# Patient Record
Sex: FEMALE | Race: WHITE | NOT HISPANIC OR LATINO
[De-identification: names, ages, dates, MRNs, and addresses within clinical notes are randomized per-mention and may not be internally consistent; named-entity substitution may affect disease eponyms.]

---

## 2017-06-28 ENCOUNTER — TRANSCRIPTION ENCOUNTER (OUTPATIENT)
Age: 22
End: 2017-06-28

## 2018-06-30 ENCOUNTER — TRANSCRIPTION ENCOUNTER (OUTPATIENT)
Age: 23
End: 2018-06-30

## 2019-01-06 ENCOUNTER — TRANSCRIPTION ENCOUNTER (OUTPATIENT)
Age: 24
End: 2019-01-06

## 2019-02-05 ENCOUNTER — OUTPATIENT (OUTPATIENT)
Dept: OUTPATIENT SERVICES | Facility: HOSPITAL | Age: 24
LOS: 1 days | Discharge: HOME | End: 2019-02-05

## 2019-02-05 DIAGNOSIS — K46.9 UNSPECIFIED ABDOMINAL HERNIA WITHOUT OBSTRUCTION OR GANGRENE: ICD-10-CM

## 2019-02-12 PROBLEM — Z00.00 ENCOUNTER FOR PREVENTIVE HEALTH EXAMINATION: Status: ACTIVE | Noted: 2019-02-12

## 2019-02-22 ENCOUNTER — APPOINTMENT (OUTPATIENT)
Dept: CARDIOTHORACIC SURGERY | Facility: CLINIC | Age: 24
End: 2019-02-22

## 2019-02-22 VITALS
HEIGHT: 67 IN | OXYGEN SATURATION: 98 % | SYSTOLIC BLOOD PRESSURE: 136 MMHG | WEIGHT: 164 LBS | DIASTOLIC BLOOD PRESSURE: 81 MMHG | HEART RATE: 93 BPM | BODY MASS INDEX: 25.74 KG/M2 | TEMPERATURE: 98.1 F | RESPIRATION RATE: 12 BRPM

## 2019-02-22 DIAGNOSIS — M24.80 OTHER SPECIFIC JOINT DERANGEMENTS OF UNSPECIFIED JOINT, NOT ELSEWHERE CLASSIFIED: ICD-10-CM

## 2019-02-22 DIAGNOSIS — S73.199A OTHER SPRAIN OF UNSPECIFIED HIP, INITIAL ENCOUNTER: ICD-10-CM

## 2019-02-22 DIAGNOSIS — Z78.9 OTHER SPECIFIED HEALTH STATUS: ICD-10-CM

## 2019-02-22 DIAGNOSIS — Z86.59 PERSONAL HISTORY OF OTHER MENTAL AND BEHAVIORAL DISORDERS: ICD-10-CM

## 2019-02-22 DIAGNOSIS — F64.0 TRANSSEXUALISM: ICD-10-CM

## 2019-02-22 DIAGNOSIS — Z82.49 FAMILY HISTORY OF ISCHEMIC HEART DISEASE AND OTHER DISEASES OF THE CIRCULATORY SYSTEM: ICD-10-CM

## 2019-02-22 DIAGNOSIS — F17.200 NICOTINE DEPENDENCE, UNSPECIFIED, UNCOMPLICATED: ICD-10-CM

## 2019-02-22 DIAGNOSIS — R93.3 ABNORMAL FINDINGS ON DIAGNOSTIC IMAGING OF OTHER PARTS OF DIGESTIVE TRACT: ICD-10-CM

## 2019-02-22 RX ORDER — ERGOCALCIFEROL (VITAMIN D2) 1250 MCG
50000 CAPSULE ORAL
Refills: 0 | Status: ACTIVE | COMMUNITY

## 2019-02-22 RX ORDER — CYANOCOBALAMIN (VITAMIN B-12) 5000 MCG
5000 TABLET,DISINTEGRATING ORAL
Refills: 0 | Status: ACTIVE | COMMUNITY

## 2019-02-22 NOTE — PHYSICAL EXAM
[General Appearance - Alert] : alert [General Appearance - In No Acute Distress] : in no acute distress [Sclera] : the sclera and conjunctiva were normal [PERRL With Normal Accommodation] : pupils were equal in size, round, and reactive to light [Extraocular Movements] : extraocular movements were intact [Outer Ear] : the ears and nose were normal in appearance [Oropharynx] : the oropharynx was normal [Neck Appearance] : the appearance of the neck was normal [Neck Cervical Mass (___cm)] : no neck mass was observed [Jugular Venous Distention Increased] : there was no jugular-venous distention [Thyroid Diffuse Enlargement] : the thyroid was not enlarged [Thyroid Nodule] : there were no palpable thyroid nodules [] : no respiratory distress [Auscultation Breath Sounds / Voice Sounds] : lungs were clear to auscultation bilaterally [Heart Rate And Rhythm] : heart rate was normal and rhythm regular [Heart Sounds] : normal S1 and S2 [Heart Sounds Gallop] : no gallops [Murmurs] : no murmurs [Heart Sounds Pericardial Friction Rub] : no pericardial rub [Distended] : distended [Epigastric] : in the epigastric area [Periumbilical] : in the periumbilical area [Abnormal Walk] : normal gait [Nail Clubbing] : no clubbing  or cyanosis of the fingernails [Musculoskeletal - Swelling] : no joint swelling seen [Motor Tone] : muscle strength and tone were normal [Deep Tendon Reflexes (DTR)] : deep tendon reflexes were 2+ and symmetric [Sensation] : the sensory exam was normal to light touch and pinprick [No Focal Deficits] : no focal deficits [Oriented To Time, Place, And Person] : oriented to person, place, and time [Impaired Insight] : insight and judgment were intact [Affect] : the affect was normal

## 2019-02-27 ENCOUNTER — FORM ENCOUNTER (OUTPATIENT)
Age: 24
End: 2019-02-27

## 2019-02-28 ENCOUNTER — OUTPATIENT (OUTPATIENT)
Dept: OUTPATIENT SERVICES | Facility: HOSPITAL | Age: 24
LOS: 1 days | Discharge: HOME | End: 2019-02-28

## 2019-02-28 DIAGNOSIS — R10.2 PELVIC AND PERINEAL PAIN: ICD-10-CM

## 2019-02-28 DIAGNOSIS — K31.89 OTHER DISEASES OF STOMACH AND DUODENUM: ICD-10-CM

## 2019-02-28 DIAGNOSIS — R10.9 UNSPECIFIED ABDOMINAL PAIN: ICD-10-CM

## 2019-03-03 NOTE — REASON FOR VISIT
[Initial Evaluation] : an initial evaluation [Parent] : parent [FreeTextEntry1] : Organoaxial rotation of stomach
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
## 2019-03-03 NOTE — CONSULT LETTER
[Dear  ___] : Dear  [unfilled], [Consult Letter:] : I had the pleasure of evaluating your patient, [unfilled]. [Consult Closing:] : Thank you very much for allowing me to participate in the care of this patient.  If you have any questions, please do not hesitate to contact me. [Sincerely,] : Sincerely, [FreeTextEntry2] : Chelle Jorge [FreeTextEntry1] : This 24 year old patient presents with imaging suggestive of organo-axial rotation of the stomach.  They have symptoms of obstruction that were initially attributed to indigestion or OB issues.  There is a question of hiatal hernia, but the patient notes that this is not the case, this appears to have been a documentation issue in the past.  I would plan for laparoscopic repair of rotation (derotation and gastropexy).  It may be feasible to also perform hysterectomy at this time as the patient is desirous of this as part of their transitioning.  I do not see any medical contraindication to this.  Will plan for CT abdomen in the interim for better surgical planning.  I did discuss with the patient that any further abdominal pain episodes, vomiting, inability to tolerate oral intake, should be addressed with a low threshold for evaluation in the ER to ensure there is not obstruction or vascular compromise of the stomach. [FreeTextEntry3] : Daron Delong M.D.\par Chief, Division of Thoracic Surgery\par Department of Cardiothoracic Surgery\par

## 2019-03-03 NOTE — DATA REVIEWED
[FreeTextEntry1] : EXAM: XR UGI AIR CONTRAST W KUB  02/05/2019 \par \par INTERPRETATION: Clinical History / Reason for exam: Follow-up hiatal hernia \par on endoscopy. \par \par Procedure/technique: Double contrast barium upper GI series was performed. \par EZ gas is given orally. Following the oral ingestion of barium, rapid \par sequence imaging of the esophagus, stomach and proximal small bowel was done \par under fluoroscopic control. \par \par Comparison: None. \par \par Findings: \par \par The swallowing mechanism is intact. \par \par Barium transverses the esophagus with no obstruction. There is no evidence \par of mass or ulceration. \par \par There is no evidence of hiatal hernia. The GE junction is in normal \par position. There is noted to be organoaxial rotation of the stomach, with the \par antrum rotated superiorly along the long axis of the stomach. The duodenal \par bulb is unremarkable. \par \par There is no evidence of gastroesophageal reflux. There is no evidence of \par obstruction. \par \par Stomach, duodenum and several loops of proximal small bowel are filled with \par no evidence of mass or ulceration. \par \par A 13 mm barium tablet transverse the esophagus with no obstruction. \par \par Impression: \par No evidence of hiatal hernia, with normal positioning of the GE junction. \par Organoaxial rotation of the stomach, with antrum rotated superiorly along the long axis of the stomach. \par No evidence of gastric outlet obstruction. \par \par Dr. Jorge office visit 2/11/19\par Impression\par Abnormal findings on diagnostic imaging of other parts of digestive tract- R/O Organoaxial Rotation\par \par EGD 1/29/19\par Impression:\par Diaphragmatic hernia without obstruction or gangrene\par Acute gastritis without bleeding
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
## 2019-03-03 NOTE — ADDENDUM
[FreeTextEntry1] : Patient is scheduled for the CT scan for 2/28/19, and will followup with GYN Dr. Daron Perez 3/25/19 for eval.

## 2019-03-25 ENCOUNTER — OUTPATIENT (OUTPATIENT)
Dept: OUTPATIENT SERVICES | Facility: HOSPITAL | Age: 24
LOS: 1 days | Discharge: HOME | End: 2019-03-25

## 2019-03-25 ENCOUNTER — APPOINTMENT (OUTPATIENT)
Dept: OBGYN | Facility: CLINIC | Age: 24
End: 2019-03-25

## 2019-03-25 VITALS — SYSTOLIC BLOOD PRESSURE: 120 MMHG | WEIGHT: 170 LBS | DIASTOLIC BLOOD PRESSURE: 70 MMHG

## 2019-03-25 DIAGNOSIS — B97.7 PAPILLOMAVIRUS AS THE CAUSE OF DISEASES CLASSIFIED ELSEWHERE: ICD-10-CM

## 2019-03-25 NOTE — PHYSICAL EXAM
[Alert] : alert [Acute Distress] : no acute distress [Soft] : soft [Tender] : non tender [Distended] : not distended [H/Smegaly] : no hepatosplenomegaly [Labia Majora] : labia major [Labia Minora] : labia minora [Normal] : clitoris [Pap Obtained] : a Pap smear was performed [FreeTextEntry4] : very narrow vagina [FreeTextEntry5] : minimal blood on cervix

## 2019-03-26 DIAGNOSIS — N80.9 ENDOMETRIOSIS, UNSPECIFIED: ICD-10-CM

## 2019-03-26 DIAGNOSIS — R10.2 PELVIC AND PERINEAL PAIN: ICD-10-CM

## 2019-03-26 DIAGNOSIS — N92.6 IRREGULAR MENSTRUATION, UNSPECIFIED: ICD-10-CM

## 2019-03-26 DIAGNOSIS — F64.0 TRANSSEXUALISM: ICD-10-CM

## 2019-04-02 LAB — HPV HIGH+LOW RISK DNA PNL CVX: NOT DETECTED

## 2019-04-08 ENCOUNTER — APPOINTMENT (OUTPATIENT)
Dept: CARDIOLOGY | Facility: CLINIC | Age: 24
End: 2019-04-08
Payer: COMMERCIAL

## 2019-04-08 VITALS
SYSTOLIC BLOOD PRESSURE: 96 MMHG | HEIGHT: 67 IN | HEART RATE: 74 BPM | DIASTOLIC BLOOD PRESSURE: 60 MMHG | WEIGHT: 166 LBS | BODY MASS INDEX: 26.06 KG/M2

## 2019-04-08 PROCEDURE — 93000 ELECTROCARDIOGRAM COMPLETE: CPT

## 2019-04-08 PROCEDURE — 99204 OFFICE O/P NEW MOD 45 MIN: CPT

## 2019-04-08 RX ORDER — FOLIC ACID 1 MG/1
1 TABLET ORAL DAILY
Refills: 0 | Status: ACTIVE | COMMUNITY

## 2019-04-09 ENCOUNTER — FORM ENCOUNTER (OUTPATIENT)
Age: 24
End: 2019-04-09

## 2019-04-09 ENCOUNTER — OUTPATIENT (OUTPATIENT)
Dept: OUTPATIENT SERVICES | Facility: HOSPITAL | Age: 24
LOS: 1 days | Discharge: HOME | End: 2019-04-09
Payer: COMMERCIAL

## 2019-04-09 VITALS
OXYGEN SATURATION: 98 % | HEART RATE: 80 BPM | DIASTOLIC BLOOD PRESSURE: 83 MMHG | WEIGHT: 163.14 LBS | TEMPERATURE: 98 F | SYSTOLIC BLOOD PRESSURE: 136 MMHG | HEIGHT: 66 IN | RESPIRATION RATE: 20 BRPM

## 2019-04-09 DIAGNOSIS — Z01.818 ENCOUNTER FOR OTHER PREPROCEDURAL EXAMINATION: ICD-10-CM

## 2019-04-09 DIAGNOSIS — K31.89 OTHER DISEASES OF STOMACH AND DUODENUM: ICD-10-CM

## 2019-04-09 LAB
ALBUMIN SERPL ELPH-MCNC: 4.9 G/DL — SIGNIFICANT CHANGE UP (ref 3.5–5.2)
ALP SERPL-CCNC: 88 U/L — SIGNIFICANT CHANGE UP (ref 30–115)
ALT FLD-CCNC: 17 U/L — SIGNIFICANT CHANGE UP (ref 0–41)
ANION GAP SERPL CALC-SCNC: 14 MMOL/L — SIGNIFICANT CHANGE UP (ref 7–14)
APPEARANCE UR: CLEAR — SIGNIFICANT CHANGE UP
APTT BLD: 32.6 SEC — SIGNIFICANT CHANGE UP (ref 27–39.2)
AST SERPL-CCNC: 16 U/L — SIGNIFICANT CHANGE UP (ref 0–41)
BACTERIA # UR AUTO: ABNORMAL /HPF
BASOPHILS # BLD AUTO: 0.05 K/UL — SIGNIFICANT CHANGE UP (ref 0–0.2)
BASOPHILS NFR BLD AUTO: 0.6 % — SIGNIFICANT CHANGE UP (ref 0–1)
BILIRUB SERPL-MCNC: 0.3 MG/DL — SIGNIFICANT CHANGE UP (ref 0.2–1.2)
BILIRUB UR-MCNC: NEGATIVE — SIGNIFICANT CHANGE UP
BLD GP AB SCN SERPL QL: SIGNIFICANT CHANGE UP
BUN SERPL-MCNC: 12 MG/DL — SIGNIFICANT CHANGE UP (ref 10–20)
CALCIUM SERPL-MCNC: 10.5 MG/DL — HIGH (ref 8.5–10.1)
CHLORIDE SERPL-SCNC: 99 MMOL/L — SIGNIFICANT CHANGE UP (ref 98–110)
CO2 SERPL-SCNC: 27 MMOL/L — SIGNIFICANT CHANGE UP (ref 17–32)
COLOR SPEC: YELLOW — SIGNIFICANT CHANGE UP
CREAT SERPL-MCNC: 0.8 MG/DL — SIGNIFICANT CHANGE UP (ref 0.7–1.5)
DIFF PNL FLD: NEGATIVE — SIGNIFICANT CHANGE UP
EOSINOPHIL # BLD AUTO: 0.31 K/UL — SIGNIFICANT CHANGE UP (ref 0–0.7)
EOSINOPHIL NFR BLD AUTO: 3.5 % — SIGNIFICANT CHANGE UP (ref 0–8)
GLUCOSE SERPL-MCNC: 78 MG/DL — SIGNIFICANT CHANGE UP (ref 70–99)
GLUCOSE UR QL: NEGATIVE MG/DL — SIGNIFICANT CHANGE UP
HCT VFR BLD CALC: 45.5 % — SIGNIFICANT CHANGE UP (ref 37–47)
HGB BLD-MCNC: 15.3 G/DL — SIGNIFICANT CHANGE UP (ref 12–16)
IMM GRANULOCYTES NFR BLD AUTO: 0.3 % — SIGNIFICANT CHANGE UP (ref 0.1–0.3)
INR BLD: 1.03 RATIO — SIGNIFICANT CHANGE UP (ref 0.65–1.3)
KETONES UR-MCNC: NEGATIVE — SIGNIFICANT CHANGE UP
LEUKOCYTE ESTERASE UR-ACNC: ABNORMAL
LYMPHOCYTES # BLD AUTO: 2.62 K/UL — SIGNIFICANT CHANGE UP (ref 1.2–3.4)
LYMPHOCYTES # BLD AUTO: 29.9 % — SIGNIFICANT CHANGE UP (ref 20.5–51.1)
MCHC RBC-ENTMCNC: 29.5 PG — SIGNIFICANT CHANGE UP (ref 27–31)
MCHC RBC-ENTMCNC: 33.6 G/DL — SIGNIFICANT CHANGE UP (ref 32–37)
MCV RBC AUTO: 87.7 FL — SIGNIFICANT CHANGE UP (ref 81–99)
MONOCYTES # BLD AUTO: 0.42 K/UL — SIGNIFICANT CHANGE UP (ref 0.1–0.6)
MONOCYTES NFR BLD AUTO: 4.8 % — SIGNIFICANT CHANGE UP (ref 1.7–9.3)
NEUTROPHILS # BLD AUTO: 5.33 K/UL — SIGNIFICANT CHANGE UP (ref 1.4–6.5)
NEUTROPHILS NFR BLD AUTO: 60.9 % — SIGNIFICANT CHANGE UP (ref 42.2–75.2)
NITRITE UR-MCNC: NEGATIVE — SIGNIFICANT CHANGE UP
NRBC # BLD: 0 /100 WBCS — SIGNIFICANT CHANGE UP (ref 0–0)
PH UR: 7 — SIGNIFICANT CHANGE UP (ref 5–8)
PLATELET # BLD AUTO: 307 K/UL — SIGNIFICANT CHANGE UP (ref 130–400)
POTASSIUM SERPL-MCNC: 4.7 MMOL/L — SIGNIFICANT CHANGE UP (ref 3.5–5)
POTASSIUM SERPL-SCNC: 4.7 MMOL/L — SIGNIFICANT CHANGE UP (ref 3.5–5)
PROT SERPL-MCNC: 8.1 G/DL — HIGH (ref 6–8)
PROT UR-MCNC: NEGATIVE MG/DL — SIGNIFICANT CHANGE UP
PROTHROM AB SERPL-ACNC: 11.8 SEC — SIGNIFICANT CHANGE UP (ref 9.95–12.87)
RBC # BLD: 5.19 M/UL — SIGNIFICANT CHANGE UP (ref 4.2–5.4)
RBC # FLD: 12.1 % — SIGNIFICANT CHANGE UP (ref 11.5–14.5)
SODIUM SERPL-SCNC: 140 MMOL/L — SIGNIFICANT CHANGE UP (ref 135–146)
SP GR SPEC: 1.01 — SIGNIFICANT CHANGE UP (ref 1.01–1.03)
TYPE + AB SCN PNL BLD: SIGNIFICANT CHANGE UP
UROBILINOGEN FLD QL: 0.2 MG/DL — SIGNIFICANT CHANGE UP (ref 0.2–0.2)
WBC # BLD: 8.76 K/UL — SIGNIFICANT CHANGE UP (ref 4.8–10.8)
WBC # FLD AUTO: 8.76 K/UL — SIGNIFICANT CHANGE UP (ref 4.8–10.8)
WBC UR QL: SIGNIFICANT CHANGE UP /HPF

## 2019-04-09 PROCEDURE — 93010 ELECTROCARDIOGRAM REPORT: CPT

## 2019-04-09 NOTE — H&P PST ADULT - ATTENDING COMMENTS
Thoracic Attending.    I have seen and examined the patient and the only updates to the clinic H+P are continued nausea and vomiting, pre surgical testing.  Plan is for robotic laparoscopic gastropexy in conjunction with Ob/Gyn procedure.  We discussed the risks and benefits of the procedure including damage to neighboring structures, recurrence, perforation of GI tract.  The patient and family understand and wish to proceed.

## 2019-04-09 NOTE — H&P PST ADULT - HISTORY OF PRESENT ILLNESS
24 y/ female scheduled for robotic assisted laparscopy in gastropexy with   esophagogastroduodenoscopy robotic assisted total lap hysterectomy b/l salpingooopherectomy  reports no c/o cp,sob,palpitations,cough or dysuria  1-2 fos without sob 24 y/ female scheduled for robotic assisted laparscopy in gastropexy with   esophagogastroduodenoscopy robotic assisted total lap hysterectomy b/l salpingooopherectomy  pt with h/o of  UDS  and requires surgical intervention  The pt would like to be called "Janis" , they are in the process of transitioning . prefers pronouns such as , "they ,them & theirs"  reports no c/o cp,sob,palpitations,cough or dysuria  1-2 fos without sob

## 2019-04-10 ENCOUNTER — OTHER (OUTPATIENT)
Age: 24
End: 2019-04-10

## 2019-04-10 LAB
CULTURE RESULTS: SIGNIFICANT CHANGE UP
SPECIMEN SOURCE: SIGNIFICANT CHANGE UP

## 2019-04-10 PROCEDURE — 71046 X-RAY EXAM CHEST 2 VIEWS: CPT | Mod: 26

## 2019-04-15 ENCOUNTER — APPOINTMENT (OUTPATIENT)
Dept: CARDIOLOGY | Facility: CLINIC | Age: 24
End: 2019-04-15
Payer: COMMERCIAL

## 2019-04-15 PROCEDURE — 93015 CV STRESS TEST SUPVJ I&R: CPT

## 2019-04-16 ENCOUNTER — APPOINTMENT (OUTPATIENT)
Dept: CARDIOLOGY | Facility: CLINIC | Age: 24
End: 2019-04-16
Payer: COMMERCIAL

## 2019-04-16 PROCEDURE — 93306 TTE W/DOPPLER COMPLETE: CPT

## 2019-04-16 NOTE — REASON FOR VISIT
[Initial Evaluation] : an initial evaluation of [FreeTextEntry1] : 24 year-old patient referred for cardiac evaluation.\par \par No cardiac history.\par \par Notably., patient is transgender and in process of female to male transition (testosterone therapy and breast binding).  History is significant for organoaxial stomach rotation and reported uterine underdevelopment with debilitating stomach and pelvic pain.  Scheduled for combined laparoscopic derotation gastropexy + TAHBSO.\par \par Exercise now limited by pain.  Very active until several months ago, at which time she danced and performed vigorously without exertional symptoms.\par \par No chest pain.  Breathing comfortable.\par \par Occasional palpitations.  Often associated with stress / anxiety.  No other clear precipitants.  Generally brief (seconds to minutes).  Occur several times / month.  Occasionally associated with lightheadedness.  No syncope.

## 2019-04-16 NOTE — DISCUSSION/SUMMARY
[FreeTextEntry1] : EST to asses for exercise induced ectopy.\par ECHO to rule-out structural heart disease.\par \par Above tests reasonable for palpitations irrespective of surgery.  Final preoperative recommendations pending results, but in the absence of severe pathology, do not envision surgical delay for cardiac reason.

## 2019-04-18 ENCOUNTER — RESULT REVIEW (OUTPATIENT)
Age: 24
End: 2019-04-18

## 2019-04-18 ENCOUNTER — INPATIENT (INPATIENT)
Facility: HOSPITAL | Age: 24
LOS: 0 days | Discharge: HOME | End: 2019-04-19
Attending: THORACIC SURGERY (CARDIOTHORACIC VASCULAR SURGERY) | Admitting: THORACIC SURGERY (CARDIOTHORACIC VASCULAR SURGERY)
Payer: COMMERCIAL

## 2019-04-18 VITALS
SYSTOLIC BLOOD PRESSURE: 115 MMHG | HEIGHT: 67 IN | TEMPERATURE: 99 F | OXYGEN SATURATION: 97 % | RESPIRATION RATE: 18 BRPM | HEART RATE: 88 BPM | WEIGHT: 164.02 LBS | DIASTOLIC BLOOD PRESSURE: 78 MMHG

## 2019-04-18 LAB — GLUCOSE BLDC GLUCOMTR-MCNC: 102 MG/DL — HIGH (ref 70–99)

## 2019-04-18 PROCEDURE — 88307 TISSUE EXAM BY PATHOLOGIST: CPT | Mod: 26

## 2019-04-18 RX ORDER — MORPHINE SULFATE 50 MG/1
4 CAPSULE, EXTENDED RELEASE ORAL ONCE
Qty: 0 | Refills: 0 | Status: DISCONTINUED | OUTPATIENT
Start: 2019-04-18 | End: 2019-04-18

## 2019-04-18 RX ORDER — PANTOPRAZOLE SODIUM 20 MG/1
40 TABLET, DELAYED RELEASE ORAL EVERY 12 HOURS
Qty: 0 | Refills: 0 | Status: DISCONTINUED | OUTPATIENT
Start: 2019-04-18 | End: 2019-04-19

## 2019-04-18 RX ORDER — PANTOPRAZOLE SODIUM 20 MG/1
40 TABLET, DELAYED RELEASE ORAL
Qty: 0 | Refills: 0 | Status: DISCONTINUED | OUTPATIENT
Start: 2019-04-18 | End: 2019-04-18

## 2019-04-18 RX ORDER — PREGABALIN 225 MG/1
2 CAPSULE ORAL
Qty: 0 | Refills: 0 | COMMUNITY

## 2019-04-18 RX ORDER — HYDROMORPHONE HYDROCHLORIDE 2 MG/ML
0.5 INJECTION INTRAMUSCULAR; INTRAVENOUS; SUBCUTANEOUS
Qty: 0 | Refills: 0 | Status: DISCONTINUED | OUTPATIENT
Start: 2019-04-18 | End: 2019-04-18

## 2019-04-18 RX ORDER — MEPERIDINE HYDROCHLORIDE 50 MG/ML
12.5 INJECTION INTRAMUSCULAR; INTRAVENOUS; SUBCUTANEOUS
Qty: 0 | Refills: 0 | Status: DISCONTINUED | OUTPATIENT
Start: 2019-04-18 | End: 2019-04-18

## 2019-04-18 RX ORDER — ONDANSETRON 8 MG/1
4 TABLET, FILM COATED ORAL ONCE
Qty: 0 | Refills: 0 | Status: DISCONTINUED | OUTPATIENT
Start: 2019-04-18 | End: 2019-04-18

## 2019-04-18 RX ORDER — SODIUM CHLORIDE 9 MG/ML
1000 INJECTION, SOLUTION INTRAVENOUS
Qty: 0 | Refills: 0 | Status: DISCONTINUED | OUTPATIENT
Start: 2019-04-18 | End: 2019-04-18

## 2019-04-18 RX ORDER — ACETAMINOPHEN 500 MG
650 TABLET ORAL EVERY 6 HOURS
Qty: 0 | Refills: 0 | Status: DISCONTINUED | OUTPATIENT
Start: 2019-04-18 | End: 2019-04-19

## 2019-04-18 RX ORDER — OXYCODONE AND ACETAMINOPHEN 5; 325 MG/1; MG/1
1 TABLET ORAL EVERY 4 HOURS
Qty: 0 | Refills: 0 | Status: DISCONTINUED | OUTPATIENT
Start: 2019-04-18 | End: 2019-04-19

## 2019-04-18 RX ORDER — SODIUM CHLORIDE 9 MG/ML
1000 INJECTION INTRAMUSCULAR; INTRAVENOUS; SUBCUTANEOUS
Qty: 0 | Refills: 0 | Status: DISCONTINUED | OUTPATIENT
Start: 2019-04-18 | End: 2019-04-19

## 2019-04-18 RX ORDER — MORPHINE SULFATE 50 MG/1
4 CAPSULE, EXTENDED RELEASE ORAL EVERY 4 HOURS
Qty: 0 | Refills: 0 | Status: DISCONTINUED | OUTPATIENT
Start: 2019-04-18 | End: 2019-04-19

## 2019-04-18 RX ORDER — PANTOPRAZOLE SODIUM 20 MG/1
40 TABLET, DELAYED RELEASE ORAL DAILY
Qty: 0 | Refills: 0 | Status: DISCONTINUED | OUTPATIENT
Start: 2019-04-18 | End: 2019-04-18

## 2019-04-18 RX ORDER — PANTOPRAZOLE SODIUM 20 MG/1
20 TABLET, DELAYED RELEASE ORAL EVERY 12 HOURS
Qty: 0 | Refills: 0 | Status: DISCONTINUED | OUTPATIENT
Start: 2019-04-18 | End: 2019-04-18

## 2019-04-18 RX ORDER — HYDROMORPHONE HYDROCHLORIDE 2 MG/ML
1 INJECTION INTRAMUSCULAR; INTRAVENOUS; SUBCUTANEOUS
Qty: 0 | Refills: 0 | Status: DISCONTINUED | OUTPATIENT
Start: 2019-04-18 | End: 2019-04-18

## 2019-04-18 RX ORDER — ONDANSETRON 8 MG/1
4 TABLET, FILM COATED ORAL EVERY 6 HOURS
Qty: 0 | Refills: 0 | Status: DISCONTINUED | OUTPATIENT
Start: 2019-04-18 | End: 2019-04-19

## 2019-04-18 RX ORDER — FAMOTIDINE 10 MG/ML
1 INJECTION INTRAVENOUS
Qty: 0 | Refills: 0 | COMMUNITY

## 2019-04-18 RX ORDER — FOLIC ACID 0.8 MG
1 TABLET ORAL
Qty: 0 | Refills: 0 | COMMUNITY

## 2019-04-18 RX ORDER — ERGOCALCIFEROL 1.25 MG/1
1 CAPSULE ORAL
Qty: 0 | Refills: 0 | COMMUNITY

## 2019-04-18 RX ORDER — ACETAMINOPHEN 500 MG
1000 TABLET ORAL ONCE
Qty: 0 | Refills: 0 | Status: DISCONTINUED | OUTPATIENT
Start: 2019-04-18 | End: 2019-04-18

## 2019-04-18 RX ORDER — OMEPRAZOLE 10 MG/1
1 CAPSULE, DELAYED RELEASE ORAL
Qty: 0 | Refills: 0 | COMMUNITY

## 2019-04-18 RX ORDER — HEPARIN SODIUM 5000 [USP'U]/ML
5000 INJECTION INTRAVENOUS; SUBCUTANEOUS EVERY 8 HOURS
Qty: 0 | Refills: 0 | Status: DISCONTINUED | OUTPATIENT
Start: 2019-04-18 | End: 2019-04-19

## 2019-04-18 RX ADMIN — OXYCODONE AND ACETAMINOPHEN 1 TABLET(S): 5; 325 TABLET ORAL at 17:53

## 2019-04-18 RX ADMIN — MORPHINE SULFATE 4 MILLIGRAM(S): 50 CAPSULE, EXTENDED RELEASE ORAL at 15:30

## 2019-04-18 RX ADMIN — HEPARIN SODIUM 5000 UNIT(S): 5000 INJECTION INTRAVENOUS; SUBCUTANEOUS at 15:29

## 2019-04-18 RX ADMIN — HYDROMORPHONE HYDROCHLORIDE 0.5 MILLIGRAM(S): 2 INJECTION INTRAMUSCULAR; INTRAVENOUS; SUBCUTANEOUS at 13:35

## 2019-04-18 RX ADMIN — HYDROMORPHONE HYDROCHLORIDE 0.5 MILLIGRAM(S): 2 INJECTION INTRAMUSCULAR; INTRAVENOUS; SUBCUTANEOUS at 14:03

## 2019-04-18 RX ADMIN — OXYCODONE AND ACETAMINOPHEN 1 TABLET(S): 5; 325 TABLET ORAL at 19:00

## 2019-04-18 RX ADMIN — MORPHINE SULFATE 4 MILLIGRAM(S): 50 CAPSULE, EXTENDED RELEASE ORAL at 22:51

## 2019-04-18 RX ADMIN — ONDANSETRON 4 MILLIGRAM(S): 8 TABLET, FILM COATED ORAL at 20:19

## 2019-04-18 RX ADMIN — SODIUM CHLORIDE 75 MILLILITER(S): 9 INJECTION INTRAMUSCULAR; INTRAVENOUS; SUBCUTANEOUS at 15:30

## 2019-04-18 RX ADMIN — HEPARIN SODIUM 5000 UNIT(S): 5000 INJECTION INTRAVENOUS; SUBCUTANEOUS at 22:51

## 2019-04-18 RX ADMIN — MORPHINE SULFATE 4 MILLIGRAM(S): 50 CAPSULE, EXTENDED RELEASE ORAL at 23:20

## 2019-04-18 RX ADMIN — HYDROMORPHONE HYDROCHLORIDE 0.5 MILLIGRAM(S): 2 INJECTION INTRAMUSCULAR; INTRAVENOUS; SUBCUTANEOUS at 13:05

## 2019-04-18 RX ADMIN — PANTOPRAZOLE SODIUM 40 MILLIGRAM(S): 20 TABLET, DELAYED RELEASE ORAL at 22:50

## 2019-04-18 RX ADMIN — MORPHINE SULFATE 4 MILLIGRAM(S): 50 CAPSULE, EXTENDED RELEASE ORAL at 21:19

## 2019-04-18 RX ADMIN — MORPHINE SULFATE 4 MILLIGRAM(S): 50 CAPSULE, EXTENDED RELEASE ORAL at 16:24

## 2019-04-18 RX ADMIN — HYDROMORPHONE HYDROCHLORIDE 0.5 MILLIGRAM(S): 2 INJECTION INTRAMUSCULAR; INTRAVENOUS; SUBCUTANEOUS at 15:24

## 2019-04-18 RX ADMIN — HYDROMORPHONE HYDROCHLORIDE 0.5 MILLIGRAM(S): 2 INJECTION INTRAMUSCULAR; INTRAVENOUS; SUBCUTANEOUS at 14:08

## 2019-04-18 RX ADMIN — SODIUM CHLORIDE 125 MILLILITER(S): 9 INJECTION, SOLUTION INTRAVENOUS at 12:49

## 2019-04-18 RX ADMIN — MORPHINE SULFATE 4 MILLIGRAM(S): 50 CAPSULE, EXTENDED RELEASE ORAL at 20:19

## 2019-04-18 NOTE — PATIENT PROFILE ADULT - STATED REASON FOR ADMISSION
S/P Robotic assisted laparoscopic hysterectomy,bilateral salphingo-oophorectomy.Robotic laparoscopic gastropety

## 2019-04-18 NOTE — PROGRESS NOTE ADULT - SUBJECTIVE AND OBJECTIVE BOX
GYN PGY4 Progress Note    POD #0    Subjective: pt seen and evaluated at bedside, they report some abdominal pain at this time, 6/10 in intensity, primarily mid abdomen, radiating to incision sites, but well controlled with PO pain meds. Denies nausea, vomiting, fevers, chills, confusion, dizziness, CP, SOB. Patient is ambulating to the bathroom without difficulty. Minimal void at this time. Incentive spirometer instructions discussed with patient and nurse. No additional complaints at this time.     Physical exam:    Vital Signs Last 24 Hrs  T(F): 98.3 (18 Apr 2019 16:48), Max: 98.6 (18 Apr 2019 07:57)  HR: 88 (18 Apr 2019 16:48) (71 - 92)  BP: 117/57 (18 Apr 2019 16:48) (103/59 - 140/71)  RR: 16 (18 Apr 2019 16:48) (12 - 18)  SpO2: 97% (18 Apr 2019 16:48) (95% - 99%)    Gen: NAD, AAOx3  CVS: S1S2, RRR  Lungs: Ctab, no rhonchi or rales  Abdomen: Soft, nontender, no distension   Incision: 5 robotic port sites, 2 sites mildly saturated, otherwise clean and dry  Pelvic: no vaginal bleeding  Ext: No calf tenderness    Diet: clears    meds: (Floorstock)   1 Each &lt;See Task&gt; (04-18-19 @ 08:34)    (Floorstock)   1 Each &lt;See Task&gt; (04-18-19 @ 08:34)    (Floorstock)   2 Each &lt;See Task&gt; (04-18-19 @ 09:02)    (Floorstock)   1 Each &lt;See Task&gt; (04-18-19 @ 09:02)    (Floorstock)   1 Each &lt;See Task&gt; (04-18-19 @ 14:01)    (Floorstock)   1 Each &lt;See Task&gt; (04-18-19 @ 15:28)    (Floorstock)   1 Each &lt;See Task&gt; (04-18-19 @ 15:28)    heparin  Injectable   5000 Unit(s) SubCutaneous (04-18-19 @ 15:29)    HYDROmorphone  Injectable   0.5 milliGRAM(s) IV Push (04-18-19 @ 14:03)   0.5 milliGRAM(s) IV Push (04-18-19 @ 13:35)   0.5 milliGRAM(s) IV Push (04-18-19 @ 13:05)    lactated ringers.   125 mL/Hr IV Continuous (04-18-19 @ 12:48)    morphine  - Injectable   4 milliGRAM(s) IV Push (04-18-19 @ 15:30)    sodium chloride 0.9%.   75 mL/Hr IV Continuous (04-18-19 @ 15:29)

## 2019-04-18 NOTE — ASU PREOP CHECKLIST - SELECT TESTS ORDERED
urine preg negative/UCG POCT Blood Glucose/Type and Screen/urine preg negative/ fingerstick 102 mg/dl at 0855/G

## 2019-04-18 NOTE — BRIEF OPERATIVE NOTE - OPERATION/FINDINGS
somewhat chronically inflammed appearance of anterior stomach.  EGD showed a loose bezoaar type conglomeration.  retroflexion no hernia.  scope navigated through pylorus.
Normal uterus, bilateral tubes and right ovary. Left ovary enlarged containing a 4cm cyst.

## 2019-04-18 NOTE — BRIEF OPERATIVE NOTE - NSICDXBRIEFPROCEDURE_GEN_ALL_CORE_FT
PROCEDURES:  Laparoscopic gastropexy with reduction of gastric volvulus 18-Apr-2019 12:35:22  Daron Delong
PROCEDURES:  Hysterectomy, total, laparoscopic, robot-assisted, with BSO 18-Apr-2019 11:41:25  Gisselle Aguirre

## 2019-04-18 NOTE — PROGRESS NOTE ADULT - ASSESSMENT
23 yo G0 female to male transgender with chronic pelvic pain and left ovarian cyst, gastric malrotation, GERD s/p elective RATLH/BSO and gastropexy and EGD with Dr. Delong, EBL 30cc, POD 0, doing well  - management per Dr. Delong service  - pain management PRN  - recommend AM labs  - TOV by 1900  - per GYN team, patient can be discharged home. Postoperative hysterectomy instructions will be given, follow up in 2 weeks with Dr. Perez    will inform Dr. Perez 25 yo G0 female to male transgender with chronic pelvic pain and left ovarian cyst, gastric malrotation, GERD s/p elective RATLH/BSO and gastropexy and EGD with Dr. Delong, EBL 30cc, POD 0, doing well  - management per Dr. Delong service  - pain management PRN  	- TOV by 1900  - per GYN team, patient can be discharged home. Postoperative hysterectomy instructions will be given, follow up in 2 weeks with Dr. Perez    will inform Dr. Perez

## 2019-04-18 NOTE — BRIEF OPERATIVE NOTE - NSICDXBRIEFPREOP_GEN_ALL_CORE_FT
PRE-OP DIAGNOSIS:  Gastric volvulus 18-Apr-2019 12:35:41  Daron Delong
PRE-OP DIAGNOSIS:  Female-to-male transgender person 18-Apr-2019 11:42:23  Gisselle Aguirre  Cyst of ovary, left 18-Apr-2019 11:42:06  Gisselle Aguirre  Pain pelvic 18-Apr-2019 11:41:43  Gisselle Aguirre

## 2019-04-18 NOTE — BRIEF OPERATIVE NOTE - NSICDXBRIEFPOSTOP_GEN_ALL_CORE_FT
POST-OP DIAGNOSIS:  Gastric volvulus 18-Apr-2019 12:35:54  Daron Delong
POST-OP DIAGNOSIS:  Female-to-male transgender person 18-Apr-2019 11:43:16  Gisselle Aguirre  Cyst of ovary, left 18-Apr-2019 11:43:07  Gisselle Aguirre  Pain pelvic 18-Apr-2019 11:42:57  Gisselle Aguirre

## 2019-04-19 ENCOUNTER — TRANSCRIPTION ENCOUNTER (OUTPATIENT)
Age: 24
End: 2019-04-19

## 2019-04-19 VITALS
TEMPERATURE: 97 F | DIASTOLIC BLOOD PRESSURE: 55 MMHG | HEART RATE: 63 BPM | RESPIRATION RATE: 18 BRPM | SYSTOLIC BLOOD PRESSURE: 98 MMHG

## 2019-04-19 RX ORDER — TRAMADOL HYDROCHLORIDE 50 MG/1
25 TABLET ORAL EVERY 6 HOURS
Qty: 0 | Refills: 0 | Status: DISCONTINUED | OUTPATIENT
Start: 2019-04-19 | End: 2019-04-19

## 2019-04-19 RX ORDER — TRAMADOL HYDROCHLORIDE 50 MG/1
0.5 TABLET ORAL
Qty: 0 | Refills: 0 | COMMUNITY
Start: 2019-04-19

## 2019-04-19 RX ORDER — TRAMADOL HYDROCHLORIDE 50 MG/1
0.5 TABLET ORAL
Qty: 10 | Refills: 0 | OUTPATIENT
Start: 2019-04-19

## 2019-04-19 RX ORDER — ACETAMINOPHEN 500 MG
2 TABLET ORAL
Qty: 0 | Refills: 0 | COMMUNITY
Start: 2019-04-19

## 2019-04-19 RX ADMIN — Medication 650 MILLIGRAM(S): at 07:58

## 2019-04-19 RX ADMIN — Medication 650 MILLIGRAM(S): at 08:19

## 2019-04-19 RX ADMIN — HEPARIN SODIUM 5000 UNIT(S): 5000 INJECTION INTRAVENOUS; SUBCUTANEOUS at 05:37

## 2019-04-19 RX ADMIN — PANTOPRAZOLE SODIUM 40 MILLIGRAM(S): 20 TABLET, DELAYED RELEASE ORAL at 05:37

## 2019-04-19 RX ADMIN — MORPHINE SULFATE 4 MILLIGRAM(S): 50 CAPSULE, EXTENDED RELEASE ORAL at 01:50

## 2019-04-19 RX ADMIN — MORPHINE SULFATE 4 MILLIGRAM(S): 50 CAPSULE, EXTENDED RELEASE ORAL at 05:37

## 2019-04-19 RX ADMIN — MORPHINE SULFATE 4 MILLIGRAM(S): 50 CAPSULE, EXTENDED RELEASE ORAL at 02:20

## 2019-04-19 RX ADMIN — TRAMADOL HYDROCHLORIDE 25 MILLIGRAM(S): 50 TABLET ORAL at 08:51

## 2019-04-19 RX ADMIN — TRAMADOL HYDROCHLORIDE 25 MILLIGRAM(S): 50 TABLET ORAL at 09:39

## 2019-04-19 RX ADMIN — ONDANSETRON 4 MILLIGRAM(S): 8 TABLET, FILM COATED ORAL at 05:37

## 2019-04-19 RX ADMIN — MORPHINE SULFATE 4 MILLIGRAM(S): 50 CAPSULE, EXTENDED RELEASE ORAL at 07:36

## 2019-04-19 RX ADMIN — SODIUM CHLORIDE 75 MILLILITER(S): 9 INJECTION INTRAMUSCULAR; INTRAVENOUS; SUBCUTANEOUS at 05:36

## 2019-04-19 NOTE — DISCHARGE NOTE PROVIDER - HOSPITAL COURSE
Patient admitted for robotic elective TAHBSO and gastropexy for gastric volvulus. Post op the patient did well and will be discharged home on full liquid diet and     follow up with Dr. Delong and Dr. Perez.

## 2019-04-19 NOTE — PROGRESS NOTE ADULT - SUBJECTIVE AND OBJECTIVE BOX
Vascular Surgery Post Operative Note    LILIAN SCOTT24y[place]  [id number]  04-19-19 @ 01:19  Procedure: Status post laparoscopic gastropexy with reduction of gastric volvulus, total hysterectomy  Post Operative day #1    Patient is resting comfortably no complaints         Operative Findings:  · Operative Findings	somewhat chronically inflammed appearance of anterior stomach.  EGD showed a loose bezoaar type conglomeration.  retroflexion no hernia.  scope navigated through pylorus.	            pmh:  Stomach discomfort  Palpitations  Gastric volvulus  Female-to-male transgender person  Cyst of ovary, left  Pain pelvic  Gastric volvulus  Female-to-male transgender person  Cyst of ovary, left  Pain pelvic  Laparoscopic gastropexy with reduction of gastric volvulus  Hysterectomy, total, laparoscopic, robot-assisted, with BSO  No significant past surgical history    Flagyl (Other)    acetaminophen   Tablet .. 650 milliGRAM(s) Oral every 6 hours PRN  heparin  Injectable 5000 Unit(s) SubCutaneous every 8 hours  morphine  - Injectable 4 milliGRAM(s) IV Push every 4 hours  ondansetron Injectable 4 milliGRAM(s) IV Push every 6 hours  oxyCODONE    5 mG/acetaminophen 325 mG 1 Tablet(s) Oral every 4 hours PRN  pantoprazole  Injectable 40 milliGRAM(s) IV Push every 12 hours  sodium chloride 0.9%. 1000 milliLiter(s) IV Continuous <Continuous>          T(C): 36.6 (04-19-19 @ 00:00), Max: 37 (04-18-19 @ 07:57)  HR: 72 (04-19-19 @ 00:00) (71 - 92)  BP: 113/63 (04-19-19 @ 00:00) (103/59 - 140/71)  RR: 18 (04-19-19 @ 00:00) (12 - 18)  SpO2: 97% (04-18-19 @ 16:48) (95% - 99%)    04-18-19 @ 07:01  -  04-19-19 @ 01:19  --------------------------------------------------------  IN: 775 mL / OUT: 500 mL / NET: 275 mL        General:Alert and oriented times 3, not in acute distress   Heart: Regular rate and rhythm, no rubs , murmurs or gallops  Lungs: Clear to auscultation bilaterally, no wheezes, rales, rhonci appreciated  Abdomen: Soft , positive bowel sounds, no tenderness, no distention, no peritoneal signs laparoscopic incisions clean dry and intact                   140   |  99    |  12                 Ca: 10.5   BMP:   ----------------------------< 78     Mg: x     (04-09-19 @ 18:00)             4.7    |  27    | 0.8                Ph: x        LFT:     TPro: 8.1 / Alb: 4.9 / TBili: 0.3 / DBili: x / AST: 16 / ALT: 17 / AlkPhos: 88   (04-09-19 @ 18:00)                         Plan and assessment  Pt. 24yFemale status post gastropexy and total hysterectomy    -Pain management  -clears,  -encourage ambulation  -dispo planning   -incentive spirometry      PAYTON Devlin   04-19-19 @ 01:19  # [id number]/ [id number]

## 2019-04-19 NOTE — DISCHARGE NOTE PROVIDER - NSDCCPCAREPLAN_GEN_ALL_CORE_FT
PRINCIPAL DISCHARGE DIAGNOSIS  Diagnosis: Gastric volvulus  Assessment and Plan of Treatment:       SECONDARY DISCHARGE DIAGNOSES  Diagnosis: Person who has had sex change operation  Assessment and Plan of Treatment:

## 2019-04-19 NOTE — PROGRESS NOTE ADULT - SUBJECTIVE AND OBJECTIVE BOX
GYN PGY4 Progress Note    POD #1    Subjective: pt seen and evaluated at bedside, reports pain is now 3-4/10, better controlled with IV morphine and percocet. Reported some nausea overnight, denies fevers, chills, vomiting, diarrhea, constipation, confusion or dizziness. Ambulating to restroom without issue. She voided and had flatus. Using incentive spirometer    Physical exam:    Vital Signs Last 24 Hrs  T(F): 96.1 (19 Apr 2019 04:00), Max: 98.6 (18 Apr 2019 07:57)  HR: 82 (19 Apr 2019 04:00) (71 - 92)  BP: 107/58 (19 Apr 2019 04:00) (103/59 - 140/71)  RR: 18 (19 Apr 2019 04:00) (12 - 18)  SpO2: 97% (18 Apr 2019 16:48) (95% - 99%)    Gen: NAD, AAOx3  CVS: S1S2, RRR  Lungs: Ctab, no rhonchi or rales  Abdomen: Soft, nontender, no distension   Incision: incision sites clean x5, nontender to palpation  Pelvic: no vaginal bleeding  Ext: No calf tenderness    Diet: regular    meds: ([place]oor[place])   1 Each &lt;See Task&gt; (04-18-19 @ 08:34)    (Floorstock)   1 Each &lt;See Task&gt; (04-18-19 @ 08:34)    (Floorstock)   2 Each &lt;See Task&gt; (04-18-19 @ 09:02)    (Floorstock)   1 Each &lt;See Task&gt; (04-18-19 @ 09:02)    (Floorstock)   1 Each &lt;See Task&gt; (04-18-19 @ 14:01)    (Floorstock)   1 Each &lt;See Task&gt; (04-18-19 @ 15:28)    (Floorstock)   1 Each &lt;See Task&gt; (04-18-19 @ 15:28)    heparin  Injectable   5000 Unit(s) SubCutaneous (04-19-19 @ 05:37)   5000 Unit(s) SubCutaneous (04-18-19 @ 22:51)   5000 Unit(s) SubCutaneous (04-18-19 @ 15:29)    HYDROmorphone  Injectable   0.5 milliGRAM(s) IV Push (04-18-19 @ 14:03)   0.5 milliGRAM(s) IV Push (04-18-19 @ 13:35)   0.5 milliGRAM(s) IV Push (04-18-19 @ 13:05)    lactated ringers.   125 mL/Hr IV Continuous (04-18-19 @ 12:48)    morphine  - Injectable   4 milliGRAM(s) IV Push (04-19-19 @ 05:37)   4 milliGRAM(s) IV Push (04-19-19 @ 01:50)   4 milliGRAM(s) IV Push (04-18-19 @ 22:51)   4 milliGRAM(s) IV Push (04-18-19 @ 15:30)    morphine  - Injectable   4 milliGRAM(s) IV Push (04-18-19 @ 20:19)    ondansetron Injectable   4 milliGRAM(s) IV Push (04-19-19 @ 05:37)   4 milliGRAM(s) IV Push (04-18-19 @ 20:19)    oxyCODONE    5 mG/acetaminophen 325 mG   1 Tablet(s) Oral (04-18-19 @ 17:53)    pantoprazole  Injectable   40 milliGRAM(s) IV Push (04-19-19 @ 05:37)   40 milliGRAM(s) IV Push (04-18-19 @ 22:50)    sodium chloride 0.9%.   75 mL/Hr IV Continuous (04-18-19 @ 15:30)   75 mL/Hr IV Continuous (04-18-19 @ 15:29)        LABS:

## 2019-04-19 NOTE — DISCHARGE NOTE NURSING/CASE MANAGEMENT/SOCIAL WORK - NSDCDPATPORTLINK_GEN_ALL_CORE
You can access the [facility], offered by [facility], by registering with the following website: [email or web address]

## 2019-04-19 NOTE — DISCHARGE NOTE NURSING/CASE MANAGEMENT/SOCIAL WORK - NSDCPEEMAIL_GEN_ALL_CORE
[facility] email [email or web address]

## 2019-04-19 NOTE — DISCHARGE NOTE PROVIDER - CARE PROVIDER_API CALL
Daron Perez)  Obstetrics and Gynecology  [address] [place] [address]  Phone: [phone]  Fax: [phone]  Follow Up Time:     Daron Delong)  Surgery; Thoracic Surgery  [address] [place] [address]  Phone: [phone]  Fax: [phone]  Follow Up Time:

## 2019-04-19 NOTE — DISCHARGE NOTE NURSING/CASE MANAGEMENT/SOCIAL WORK - NSDCPEWEB_GEN_ALL_CORE
[place] website --- [email or web address] website --- [email or web address]

## 2019-04-19 NOTE — PROGRESS NOTE ADULT - ASSESSMENT
23 yo G0 female to male transgender with chronic pelvic pain and left ovarian cyst, gastric malrotation, GERD s/p elective RATLH/BSO and gastropexy and EGD with Dr. Delong, EBL 30cc, POD 1, doing well  - management per Dr. Delong service  - pain management PRN  - per GYN team, patient can be discharged home. Postoperative hysterectomy instructions will be given, follow up in 2 weeks with Dr. Perez    will inform Dr. Perez

## 2019-04-22 PROBLEM — R10.9 UNSPECIFIED ABDOMINAL PAIN: Chronic | Status: ACTIVE | Noted: 2019-04-09

## 2019-04-22 PROBLEM — R00.2 PALPITATIONS: Chronic | Status: ACTIVE | Noted: 2019-04-09

## 2019-04-28 LAB — SURGICAL PATHOLOGY STUDY: SIGNIFICANT CHANGE UP

## 2019-04-30 DIAGNOSIS — Z28.21 IMMUNIZATION NOT CARRIED OUT BECAUSE OF PATIENT REFUSAL: ICD-10-CM

## 2019-04-30 DIAGNOSIS — G89.29 OTHER CHRONIC PAIN: ICD-10-CM

## 2019-04-30 DIAGNOSIS — K31.89 OTHER DISEASES OF STOMACH AND DUODENUM: ICD-10-CM

## 2019-04-30 DIAGNOSIS — F64.1 DUAL ROLE TRANSVESTISM: ICD-10-CM

## 2019-04-30 DIAGNOSIS — K21.9 GASTRO-ESOPHAGEAL REFLUX DISEASE WITHOUT ESOPHAGITIS: ICD-10-CM

## 2019-04-30 DIAGNOSIS — N83.202 UNSPECIFIED OVARIAN CYST, LEFT SIDE: ICD-10-CM

## 2019-04-30 DIAGNOSIS — Z88.1 ALLERGY STATUS TO OTHER ANTIBIOTIC AGENTS STATUS: ICD-10-CM

## 2019-05-01 ENCOUNTER — APPOINTMENT (OUTPATIENT)
Dept: CARDIOTHORACIC SURGERY | Facility: CLINIC | Age: 24
End: 2019-05-01

## 2019-05-01 VITALS
WEIGHT: 160 LBS | DIASTOLIC BLOOD PRESSURE: 71 MMHG | OXYGEN SATURATION: 97 % | RESPIRATION RATE: 13 BRPM | HEIGHT: 66 IN | TEMPERATURE: 98.8 F | SYSTOLIC BLOOD PRESSURE: 119 MMHG | BODY MASS INDEX: 25.71 KG/M2 | HEART RATE: 94 BPM

## 2019-05-01 DIAGNOSIS — K31.89 OTHER DISEASES OF STOMACH AND DUODENUM: ICD-10-CM

## 2019-05-06 ENCOUNTER — OUTPATIENT (OUTPATIENT)
Dept: OUTPATIENT SERVICES | Facility: HOSPITAL | Age: 24
LOS: 1 days | Discharge: HOME | End: 2019-05-06

## 2019-05-06 ENCOUNTER — APPOINTMENT (OUTPATIENT)
Dept: OBGYN | Facility: CLINIC | Age: 24
End: 2019-05-06
Payer: COMMERCIAL

## 2019-05-06 VITALS — HEIGHT: 66 IN | DIASTOLIC BLOOD PRESSURE: 62 MMHG | SYSTOLIC BLOOD PRESSURE: 102 MMHG

## 2019-05-06 PROCEDURE — 99024 POSTOP FOLLOW-UP VISIT: CPT

## 2019-05-08 DIAGNOSIS — F64.9 GENDER IDENTITY DISORDER, UNSPECIFIED: ICD-10-CM

## 2019-05-08 DIAGNOSIS — Z48.816 ENCOUNTER FOR SURGICAL AFTERCARE FOLLOWING SURGERY ON THE GENITOURINARY SYSTEM: ICD-10-CM

## 2019-06-03 ENCOUNTER — APPOINTMENT (OUTPATIENT)
Dept: OBGYN | Facility: CLINIC | Age: 24
End: 2019-06-03

## 2019-06-10 ENCOUNTER — OUTPATIENT (OUTPATIENT)
Dept: OUTPATIENT SERVICES | Facility: HOSPITAL | Age: 24
LOS: 1 days | Discharge: HOME | End: 2019-06-10

## 2019-06-10 ENCOUNTER — APPOINTMENT (OUTPATIENT)
Dept: OBGYN | Facility: CLINIC | Age: 24
End: 2019-06-10
Payer: COMMERCIAL

## 2019-06-10 VITALS — SYSTOLIC BLOOD PRESSURE: 110 MMHG | WEIGHT: 160 LBS | DIASTOLIC BLOOD PRESSURE: 70 MMHG

## 2019-06-10 DIAGNOSIS — F64.9 GENDER IDENTITY DISORDER, UNSPECIFIED: ICD-10-CM

## 2019-06-10 PROCEDURE — 99024 POSTOP FOLLOW-UP VISIT: CPT

## 2019-06-10 NOTE — PHYSICAL EXAM
[Awake] : awake [Alert] : alert [Acute Distress] : no acute distress [Soft] : soft [Tender] : non tender [Distended] : not distended [Normal] : vagina [Absent] : absent [FreeTextEntry4] : cuff completely healed. No bleeding.

## 2019-06-12 DIAGNOSIS — Z48.816 ENCOUNTER FOR SURGICAL AFTERCARE FOLLOWING SURGERY ON THE GENITOURINARY SYSTEM: ICD-10-CM

## 2019-06-12 DIAGNOSIS — F64.9 GENDER IDENTITY DISORDER, UNSPECIFIED: ICD-10-CM

## 2019-07-08 ENCOUNTER — APPOINTMENT (OUTPATIENT)
Dept: CARDIOLOGY | Facility: CLINIC | Age: 24
End: 2019-07-08
Payer: COMMERCIAL

## 2019-07-08 VITALS
DIASTOLIC BLOOD PRESSURE: 78 MMHG | BODY MASS INDEX: 28.28 KG/M2 | WEIGHT: 176 LBS | HEIGHT: 66 IN | SYSTOLIC BLOOD PRESSURE: 110 MMHG | HEART RATE: 95 BPM

## 2019-07-08 DIAGNOSIS — Z01.810 ENCOUNTER FOR PREPROCEDURAL CARDIOVASCULAR EXAMINATION: ICD-10-CM

## 2019-07-08 DIAGNOSIS — R00.2 PALPITATIONS: ICD-10-CM

## 2019-07-08 PROCEDURE — 99214 OFFICE O/P EST MOD 30 MIN: CPT

## 2019-07-08 PROCEDURE — 93000 ELECTROCARDIOGRAM COMPLETE: CPT

## 2019-07-08 RX ORDER — OMEPRAZOLE 20 MG/1
20 CAPSULE, DELAYED RELEASE ORAL DAILY
Refills: 0 | Status: ACTIVE | COMMUNITY

## 2019-07-08 RX ORDER — OMEPRAZOLE 40 MG/1
40 CAPSULE, DELAYED RELEASE ORAL
Refills: 0 | Status: DISCONTINUED | COMMUNITY
End: 2019-07-08

## 2019-07-08 RX ORDER — TRAMADOL HYDROCHLORIDE 50 MG/1
50 TABLET, COATED ORAL
Refills: 0 | Status: DISCONTINUED | COMMUNITY
End: 2019-07-08

## 2019-07-08 RX ORDER — TESTOSTERONE 20.25 MG/1.25G
GEL, METERED TRANSDERMAL
Refills: 0 | Status: DISCONTINUED | COMMUNITY
End: 2019-07-08

## 2019-07-08 RX ORDER — TESTOSTERONE 100 MG/ML
100 VIAL (ML) INTRAMUSCULAR AS DIRECTED
Refills: 0 | Status: ACTIVE | COMMUNITY

## 2019-07-08 NOTE — HISTORY OF PRESENT ILLNESS
[FreeTextEntry1] : 24 year-old patient referred for cardiac evaluation.\par \par No cardiac history.\par \par Notably., patient is transgender and in process of female to male transition (testosterone therapy and breast binding).  History is significant for organoaxial stomach rotation and reported uterine underdevelopment with debilitating stomach and pelvic pain.  Scheduled for combined laparoscopic derotation gastropexy + TAHBSO.\par \par Exercise now limited by pain.  Very active until several months ago, at which time she danced and performed vigorously without exertional symptoms.\par \par No chest pain.  Breathing comfortable.\par \par Occasional palpitations.  Often associated with stress / anxiety.  No other clear precipitants.  Generally brief (seconds to minutes).  Occur several times / month.  Occasionally associated with lightheadedness.  No syncope.

## 2019-07-08 NOTE — ASSESSMENT
[FreeTextEntry1] : Palpitations likely benign ST or extrasystoles.\par Related to stimulants and stress / anxiety.\par Reassuring cardiac testing.\par \par ECHO (4/16/19): nL chambers and biventricular function.  No significant valve disease.\par EST (4/15/19): No ischemia / arrhythmias (sub-maximal).

## 2019-07-08 NOTE — REASON FOR VISIT
[Follow-Up - Clinic] : a clinic follow-up of [Palpitations] : palpitations [FreeTextEntry1] : Feels well.\par \par Underwent surgery without complication.  Abdominal pain and N/V resolved.  Eating well.\par \par Palpitations overall improved.  Few times / month.  Associated with stimulants (sex / energy drinks).  Generally brief.  No syncope.\par \par Apparent post-coital headaches.  Prior migraines  (resolved with TAHBSO).\par \par No exertional symptoms.  Want to start exercising.

## 2019-07-08 NOTE — DISCUSSION/SUMMARY
[FreeTextEntry1] : Avoid energy drinks.\par Moderate caffeine / stimulants.\par Exercise.\par Monitor palpitations.  Event monitor if persistent / bothersome.\par Neurology referral offered.  Previously referred.  Will pursue if headaches continue.\par Follow-up 6-months.

## 2019-09-02 PROBLEM — Z86.59 HISTORY OF POST TRAUMATIC STRESS DISORDER: Status: RESOLVED | Noted: 2019-02-22 | Resolved: 2019-09-02

## 2019-11-20 DIAGNOSIS — R11.10 VOMITING, UNSPECIFIED: ICD-10-CM

## 2025-01-22 NOTE — ASU PATIENT PROFILE, ADULT - TOBACCO USE
BIBA from home c/o vomiting and unable to tolerate PO since 9pm
Current some day smoker
No/Hives only...

## 2025-04-02 NOTE — ASU PATIENT PROFILE, ADULT - ABILITY TO HEAR (WITH HEARING AID OR HEARING APPLIANCE IF NORMALLY USED):
Adequate: hears normal conversation without difficulty BIBA from doctor's office for abnormal EKG, as per emt, patient had a regular check up and ekg showed STEMI at the doctor's office, patient is not complaining of anything in triage

## 2025-06-04 NOTE — ASU PATIENT PROFILE, ADULT - PMH
Addended by: DAVID MUÑOZ on: 6/4/2025 04:32 PM     Modules accepted: Orders    
Palpitations    Stomach discomfort  upside down stomach